# Patient Record
Sex: MALE | Race: WHITE | NOT HISPANIC OR LATINO | Employment: UNEMPLOYED | URBAN - METROPOLITAN AREA
[De-identification: names, ages, dates, MRNs, and addresses within clinical notes are randomized per-mention and may not be internally consistent; named-entity substitution may affect disease eponyms.]

---

## 2018-07-08 ENCOUNTER — HOSPITAL ENCOUNTER (EMERGENCY)
Facility: CLINIC | Age: 48
Discharge: HOME | End: 2018-07-08
Attending: PHYSICIAN ASSISTANT | Admitting: PHYSICIAN ASSISTANT
Payer: COMMERCIAL

## 2018-07-08 VITALS
WEIGHT: 178 LBS | BODY MASS INDEX: 23.97 KG/M2 | OXYGEN SATURATION: 98 % | RESPIRATION RATE: 16 BRPM | DIASTOLIC BLOOD PRESSURE: 100 MMHG | SYSTOLIC BLOOD PRESSURE: 168 MMHG | TEMPERATURE: 97.9 F | HEART RATE: 91 BPM

## 2018-07-08 DIAGNOSIS — S00.31XA ABRASION OF NOSE, INITIAL ENCOUNTER: ICD-10-CM

## 2018-07-08 PROCEDURE — 99213 OFFICE O/P EST LOW 20 MIN: CPT | Performed by: PHYSICIAN ASSISTANT

## 2018-07-08 PROCEDURE — G0463 HOSPITAL OUTPT CLINIC VISIT: HCPCS

## 2018-07-08 NOTE — ED AVS SNAPSHOT
[facility] Emergency Department    [address] [address]    Phone:  [phone]    Fax:  [phone]                                       Romel Sumner   MRN: [id number]    Department:  [facility] Emergency Department   Date of Visit:  7/8/2018           After Visit Summary Signature Page     I have received my discharge instructions, and my questions have been answered. I have discussed any challenges I see with this plan with the nurse or doctor.    ..........................................................................................................................................  Patient/Patient Representative Signature      ..........................................................................................................................................  Patient Representative Print Name and Relationship to Patient    ..................................................               ................................................  Date                                            Time    ..........................................................................................................................................  Reviewed by Signature/Title    ...................................................              ..............................................  Date                                                            Time

## 2018-07-08 NOTE — ED AVS SNAPSHOT
[facility] Emergency Department    [address] [address]    Phone:  [phone]    Fax:  [phone]                                       Romel Sumner   MRN: [id number]    Department:  [facility] Emergency Department   Date of Visit:  7/8/2018           Patient Information     Date Of Birth          1970        Your diagnoses for this visit were:     Abrasion of nose, initial encounter        You were seen by Nina Neil PA-C.      Follow-up Information     Please follow up.    Why:  As needed, If symptoms worsen        Discharge Instructions         Abrasions  Abrasions are skin scrapes. Their treatment depends on how large and deep the abrasion is.  Home care  You may be prescribed an antibiotic cream or ointment to apply to the wound. This helps prevent infection. Follow instructions when using this medicine.  General care    To care for the abrasion, do the following each day for as long as directed by your healthcare provider.  ? If you were given a bandage, change it once a day. If your bandage sticks to the wound, soak it in warm water until it loosens.  ? Wash the area with soap and warm water. You may do this in a sink or under a tub faucet or shower. Rinse off the soap. Then pat the area dry with a clean towel.  ? If antibiotic ointment or cream was prescribed, reapply it to the wound as directed. Cover the wound with a fresh nonstick bandage. If the bandage becomes wet or dirty, change it as soon as possible.  ? Some antibiotic ointments or cream can cause an allergic reaction or dermatitis. This may cause redness, itching and or hives. If this occurs, stop using the ointment immediately and wash off any remaining ointment. You may need to take some allergy medicine to relieve symptoms.    You may use acetaminophen or ibuprofen to control pain unless another pain medicine was prescribed. Talk with your healthcare provider before using these medicines if you have  chronic liver or kidney disease or ever had a stomach ulcer or GI bleeding. Don t use ibuprofen in children younger than six months old.    Most skin wounds heal within 10 days. But an infection may occur even with treatment. So it s important to watch the wound for signs of infection as listed below.  Follow-up care  Follow up with your healthcare provider, or as advised.  When to seek medical advice  Call your healthcare provider right away if any of these occur:    Fever of 100.4 F (38 C) or higher, or as directed by your healthcare provider    Increasing pain, redness, swelling, or drainage from the wound    Bleeding from the wound that does not stop after a few minutes of steady, firm pressure    Decreased ability to move any body part near the wound  Date Last Reviewed: 3/3/2017    4175-6449 [employer]. [address] [place] [address]. All rights reserved. This information is not intended as a substitute for professional medical care. Always follow your healthcare professional's instructions.          24 Hour Appointment Hotline       To make an appointment at any [facility], call [phone] ([phone]). If you don't have a family doctor or clinic, we will help you find one. [facility] clinics are conveniently located to serve the needs of you and your family.             Review of your medicines      Notice     You have not been prescribed any medications.            Orders Needing Specimen Collection     None      Pending Results     No orders found from 7/6/2018 to 7/9/2018.            Pending Culture Results     No orders found from 7/6/2018 to 7/9/2018.            Pending Results Instructions     If you had any lab results that were not finalized at the time of your Discharge, you can call the ED Lab Result RN at [phone]. You will be contacted by this team for any positive Lab results or changes in treatment. The nurses are available 7 days a week from 10A to  "6:30P.  You can leave a message 24 hours per day and they will return your call.        Test Results From Your Hospital Stay               Thank you for choosing [facility]       Thank you for choosing [facility] for your care. Our goal is always to provide you with excellent care. Hearing back from our patients is one way we can continue to improve our services. Please take a few minutes to complete the written survey that you may receive in the mail after you visit with us. Thank you!        [employer] Information     [employer] lets you send messages to your doctor, view your test results, renew your prescriptions, schedule appointments and more. To sign up, go to [email or web address] . Click on \"Log in\" on the left side of the screen, which will take you to the Welcome page. Then click on \"Sign up Now\" on the right side of the page.     You will be asked to enter the access code listed below, as well as some personal information. Please follow the directions to create your username and password.     Your access code is: [email or web address]  Expires: 10/6/2018  7:19 PM     Your access code will  in 90 days. If you need help or a new code, please call your [facility] clinic or [phone].        Care EveryWhere ID     This is your Care EveryWhere ID. This could be used by other organizations to access your [facility] medical records  [id number]        Equal Access to Services     CHULA DE LA ROSA AH: Hadii abeba moe Somary, waaxda luqadaha, qaybta kaalmada jayjay, jama stahl. So [facility] [phone].    ATENCIÓN: Si habla español, tiene a mayer disposición servicios gratuitos de asistencia lingüística. Josie al [phone].    We comply with applicable federal civil rights laws and [place] laws. We do not discriminate on the basis of race, color, national origin, age, disability, sex, sexual orientation, or gender identity.            After Visit Summary       This is your record. Keep this " with you and show to your community pharmacist(s) and doctor(s) at your next visit.

## 2018-07-09 NOTE — DISCHARGE INSTRUCTIONS
Abrasions  Abrasions are skin scrapes. Their treatment depends on how large and deep the abrasion is.  Home care  You may be prescribed an antibiotic cream or ointment to apply to the wound. This helps prevent infection. Follow instructions when using this medicine.  General care    To care for the abrasion, do the following each day for as long as directed by your healthcare provider.  ? If you were given a bandage, change it once a day. If your bandage sticks to the wound, soak it in warm water until it loosens.  ? Wash the area with soap and warm water. You may do this in a sink or under a tub faucet or shower. Rinse off the soap. Then pat the area dry with a clean towel.  ? If antibiotic ointment or cream was prescribed, reapply it to the wound as directed. Cover the wound with a fresh nonstick bandage. If the bandage becomes wet or dirty, change it as soon as possible.  ? Some antibiotic ointments or cream can cause an allergic reaction or dermatitis. This may cause redness, itching and or hives. If this occurs, stop using the ointment immediately and wash off any remaining ointment. You may need to take some allergy medicine to relieve symptoms.    You may use acetaminophen or ibuprofen to control pain unless another pain medicine was prescribed. Talk with your healthcare provider before using these medicines if you have chronic liver or kidney disease or ever had a stomach ulcer or GI bleeding. Don t use ibuprofen in children younger than six months old.    Most skin wounds heal within 10 days. But an infection may occur even with treatment. So it s important to watch the wound for signs of infection as listed below.  Follow-up care  Follow up with your healthcare provider, or as advised.  When to seek medical advice  Call your healthcare provider right away if any of these occur:    Fever of 100.4 F (38 C) or higher, or as directed by your healthcare provider    Increasing pain, redness, swelling, or  drainage from the wound    Bleeding from the wound that does not stop after a few minutes of steady, firm pressure    Decreased ability to move any body part near the wound  Date Last Reviewed: 3/3/2017    2911-7307 [employer]. [address] [place] [address], [place] [address]. All rights reserved. This information is not intended as a substitute for professional medical care. Always follow your healthcare professional's instructions.

## 2018-07-09 NOTE — ED PROVIDER NOTES
"  History     Chief Complaint   Patient presents with     Facial Injury     no loc     HPI  Romel Sumner is a 48 year old male who is in urgent care with concern over nasal injury.  Patient states that he was at a gas station where he had several boards sticking out of the end of his truck which he actually walked into.  He had deep abrasion to the dorsum of the nose.  He has minimal pain in the area states feels like a \"stinging sensation\".  He did have some epistaxis from the right nares which has since resolved.  He denies any other focal complaints.  No headache, dizziness, lightheadedness, nausea, vomiting, photo or phonophobia, vision changes, redness, discharge.  He denies any concern over foreign body embedded in the wound.  He states his tetanus vaccine has been updated within the last 5 years.    Problem List:    Patient Active Problem List    Diagnosis Date Noted     CARDIOVASCULAR SCREENING; LDL GOAL LESS THAN 160 10/29/2014     Priority: Medium      Past Medical History:    History reviewed. No pertinent past medical history.    Past Surgical History:    History reviewed. No pertinent surgical history.    Family History:    Family History   Problem Relation Age of Onset     Unknown/Adopted Father      Social History:  Marital Status:  Single [1]  Social History   Substance Use Topics     Smoking status: Current Every Day Smoker     Packs/day: 0.50     Years: 10.00     Types: Cigarettes     Smokeless tobacco: Never Used     Alcohol use Yes      Medications:      No current outpatient prescriptions on file.    Review of Systems    Physical Exam   BP: (!) 168/100  Pulse: 91  Temp: 97.9  F (36.6  C)  Resp: 16  Weight: 80.7 kg (178 lb)  SpO2: 98 %  Physical Exam   Constitutional: He is oriented to person, place, and time. He appears well-developed and well-nourished. No distress.   HENT:   Head: Normocephalic.       Right Ear: Tympanic membrane and external ear normal.   Left Ear: Tympanic membrane and " external ear normal.   Nose: Sinus tenderness present. No mucosal edema, rhinorrhea, nasal deformity or nasal septal hematoma. Epistaxis (dried blood present on the septum in the right nares, no active bleeding at this time) is observed.  No foreign bodies. Right sinus exhibits no maxillary sinus tenderness and no frontal sinus tenderness. Left sinus exhibits no maxillary sinus tenderness and no frontal sinus tenderness.   Mouth/Throat: Uvula is midline, oropharynx is clear and moist and mucous membranes are normal.   1 cm by 3 cm deep abrasion, no subcutaneous tissue visible    Neck: Normal range of motion. Neck supple.   Neurological: He is alert and oriented to person, place, and time. No cranial nerve deficit or sensory deficit.   Skin: Skin is warm and dry. Abrasion noted. No ecchymosis, no laceration and no rash noted. No erythema.       ED Course     ED Course     Procedures        Critical Care time:  none            No results found for this or any previous visit (from the past 24 hour(s)).    Medications - No data to display    Assessments & Plan (with Medical Decision Making)     I have reviewed the nursing notes.    I have reviewed the findings, diagnosis, plan and need for follow up with the patient.       There are no discharge medications for this patient.    Final diagnoses:   Abrasion of nose, initial encounter     40-year-old male presents urgent care with concern over injury to his nose which occurred just prior to arrival when he walked into a board and that was sticking out of the back of his truck.  He was hypertensive on arrival, remainder vital signs within normal limits.  Physical exam findings as described above did show a deep abrasion to the length of his nose, no active bleeding at this time.  Wound was cleaned with Hibiclens, normal saline.  Patient did not have any significant bony tenderness along the lateral aspect of the nose, focal tenderness to palpation at site of abrasion  present.  Dried blood present in the right nares.  Discussed with patient that in width of lesion and depth wound will need to heal by secondary intent.  Patient states agreement.  I did discuss her/benefits of obtaining x-rays to rule out fracture however as there is no significant swelling patient denies any significant pain and no visible deformity he elected to defer at this time.  He does not have any signs/symptoms concerning for concussion or other clinically significant intracranial trauma.  He was discharged home stable with instructions for wound care.  Signs of infection, worrisome reasons to return discussed.  Follow-up as needed.    Disclaimer: This note consists of symbols derived from keyboarding, dictation, and/or voice recognition software. As a result, there may be errors in the script that have gone undetected.  Please consider this when interpreting information found in the chart.    7/8/2018   [facility] EMERGENCY DEPARTMENT     Nina Neil PA-C  07/10/18 1678

## 2023-12-14 ENCOUNTER — HOSPITAL ENCOUNTER (EMERGENCY)
Facility: CLINIC | Age: 53
Discharge: HOME | End: 2023-12-14
Attending: FAMILY MEDICINE | Admitting: FAMILY MEDICINE

## 2023-12-14 VITALS
DIASTOLIC BLOOD PRESSURE: 91 MMHG | WEIGHT: 165 LBS | HEART RATE: 77 BPM | RESPIRATION RATE: 14 BRPM | BODY MASS INDEX: 22.22 KG/M2 | SYSTOLIC BLOOD PRESSURE: 165 MMHG | TEMPERATURE: 97.1 F | OXYGEN SATURATION: 97 %

## 2023-12-14 DIAGNOSIS — T15.01XA FOREIGN BODY OF RIGHT CORNEA, INITIAL ENCOUNTER: ICD-10-CM

## 2023-12-14 PROCEDURE — 99283 EMERGENCY DEPT VISIT LOW MDM: CPT | Mod: 25 | Performed by: FAMILY MEDICINE

## 2023-12-14 PROCEDURE — 250N000009 HC RX 250: Performed by: FAMILY MEDICINE

## 2023-12-14 PROCEDURE — 65220 REMOVE FOREIGN BODY FROM EYE: CPT | Mod: RT | Performed by: FAMILY MEDICINE

## 2023-12-14 RX ORDER — GENTAMICIN SULFATE 3 MG/ML
2 SOLUTION/ DROPS OPHTHALMIC 4 TIMES DAILY
Qty: 5 ML | Refills: 0 | Status: SHIPPED | OUTPATIENT
Start: 2023-12-14 | End: 2023-12-19

## 2023-12-14 RX ORDER — TETRACAINE HYDROCHLORIDE 5 MG/ML
2 SOLUTION OPHTHALMIC ONCE
Status: COMPLETED | OUTPATIENT
Start: 2023-12-14 | End: 2023-12-14

## 2023-12-14 RX ADMIN — TETRACAINE HYDROCHLORIDE 2 DROP: 5 SOLUTION OPHTHALMIC at 16:13

## 2023-12-14 ASSESSMENT — ACTIVITIES OF DAILY LIVING (ADL): ADLS_ACUITY_SCORE: 33

## 2023-12-14 NOTE — DISCHARGE INSTRUCTIONS
"Gentamicin ophthalmic drops 2 drops right eye 4 times daily next 5 days.  If you feel like the eye is not back to \"normal\" over the next 24-48 hours please present for repeat evaluation either to an emergency department or to an ophthalmologist.  Tylenol/ibuprofen as needed for pain.  He may use the tetracaine drops 1 drop every couple of hours as needed for tonight.  "

## 2023-12-14 NOTE — ED PROVIDER NOTES
History     Chief Complaint   Patient presents with    Foreign Body in Eye     HPI  Romel Sumner is a 53 year old male, past medical history is unremarkable, presents to the emergency department with concerns of right eye burning, sensation of foreign body.  History is obtained from the patient who states he was brushing a piece of metal that was osmin with a steel or wire brush earlier today without eye protection and felt a sensation of something in his eye.  It has persisted despite his best attempted to get out and washed out with water.  He denies any headache nausea or vomiting.    Allergies:  No Known Allergies    Problem List:    Patient Active Problem List    Diagnosis Date Noted    CARDIOVASCULAR SCREENING; LDL GOAL LESS THAN 160 10/29/2014     Priority: Medium        Past Medical History:    No past medical history on file.    Past Surgical History:    No past surgical history on file.    Family History:    Family History   Problem Relation Age of Onset    Unknown/Adopted Father        Social History:  Marital Status:  Single [1]  Social History     Tobacco Use    Smoking status: Every Day     Packs/day: 0.50     Years: 10.00     Additional pack years: 0.00     Total pack years: 5.00     Types: Cigarettes    Smokeless tobacco: Never   Substance Use Topics    Alcohol use: Yes    Drug use: No        Medications:    gentamicin (GARAMYCIN) 0.3 % ophthalmic solution          Review of Systems   All other systems reviewed and are negative.      Physical Exam   BP: (!) 165/91  Pulse: 77  Temp: 97.1  F (36.2  C)  Resp: 14  Weight: 74.8 kg (165 lb)  SpO2: 97 %      Physical Exam  Vitals and nursing note reviewed.   Constitutional:       General: He is not in acute distress.     Appearance: Normal appearance. He is normal weight. He is not ill-appearing.   HENT:      Head: Normocephalic and atraumatic.      Right Ear: Tympanic membrane, ear canal and external ear normal.      Left Ear: Tympanic membrane, ear  canal and external ear normal.      Nose: Nose normal.      Mouth/Throat:      Mouth: Mucous membranes are dry.      Pharynx: Oropharynx is clear.   Eyes:      Comments: There is a metallic foreign body with an apparent rust ring on the right eye cornea in the upper outer quadrant just violating the pupillary axis.  Lids were everted and there is no foreign body under the upper or lower lids.  There is bulbar conjunctival injection only.  There is no purulent drainage.  Fluorescein was instilled to the eye and no additional findings are noted.   Neurological:      Mental Status: He is alert.         ED Course                 Procedures  Procedure note:  With verbal consent from the patient and after instilling a drop of tetracaine to the right eye I used an ophthalmic bur to rule in a sterile cotton-tipped applicator to remove the metallic foreign body and the rust ring in its entirety.  Well-tolerated.  No residual.              No results found for this or any previous visit (from the past 24 hour(s)).    Medications   tetracaine (PONTOCAINE) 0.5 % ophthalmic solution 2 drop (2 drops Right Eye $Given 12/14/23 8279)   Gentamicin ophthalmic drops 2 drops 4 times daily to the right eye x 5 days.  Tylenol/ibuprofen as needed for pain.  May use the tetracaine drops used at the time of ER evaluation 1 drop every couple hours to the right eye or as needed throughout the course of the night for comfort.  If the eye does not feel essentially back to normal in 24-48 hours he is to return to the emergency department or follow-up in clinic with ophthalmology.      Assessments & Plan (with Medical Decision Making)   Assessments and plan with medical decision making at the time stamp above    Disclaimer: This note consists of symbols derived from keyboarding, dictation and/or voice recognition software. As a result, there may be errors in the script that have gone undetected. Please consider this when interpreting information  found in this chart.      I have reviewed the nursing notes.    I have reviewed the findings, diagnosis, plan and need for follow up with the patient.        New Prescriptions    GENTAMICIN (GARAMYCIN) 0.3 % OPHTHALMIC SOLUTION    Place 2 drops into the right eye 4 times daily for 5 days       Final diagnoses:   Foreign body of right cornea, initial encounter - Metallic with rust ring       12/14/2023   [facility] EMERGENCY DEPT       Adam Watts MD  12/14/23 2084

## 2023-12-14 NOTE — ED TRIAGE NOTES
Right eye burning, feels like something in there, tried flushing out, was working outside     Triage Assessment (Adult)       Row Name 12/14/23 4556          Triage Assessment    Airway WDL WDL        Respiratory WDL    Respiratory WDL WDL        Peripheral/Neurovascular WDL    Peripheral Neurovascular WDL WDL        Cognitive/Neuro/Behavioral WDL    Cognitive/Neuro/Behavioral WDL WDL